# Patient Record
Sex: FEMALE | Race: WHITE | NOT HISPANIC OR LATINO
[De-identification: names, ages, dates, MRNs, and addresses within clinical notes are randomized per-mention and may not be internally consistent; named-entity substitution may affect disease eponyms.]

---

## 2017-03-16 ENCOUNTER — APPOINTMENT (OUTPATIENT)
Dept: PSYCHIATRY | Facility: CLINIC | Age: 27
End: 2017-03-16

## 2017-07-11 ENCOUNTER — EMERGENCY (EMERGENCY)
Facility: HOSPITAL | Age: 27
LOS: 1 days | Discharge: HOME | End: 2017-07-11
Admitting: EMERGENCY MEDICINE
Payer: MEDICAID

## 2017-07-11 PROCEDURE — 12013 RPR F/E/E/N/L/M 2.6-5.0 CM: CPT

## 2017-07-11 PROCEDURE — 99282 EMERGENCY DEPT VISIT SF MDM: CPT | Mod: 25

## 2017-07-11 PROCEDURE — 99283 EMERGENCY DEPT VISIT LOW MDM: CPT | Mod: 25

## 2017-07-21 ENCOUNTER — EMERGENCY (EMERGENCY)
Facility: HOSPITAL | Age: 27
LOS: 1 days | Discharge: HOME | End: 2017-07-21
Admitting: EMERGENCY MEDICINE
Payer: MEDICAID

## 2017-07-21 PROCEDURE — G0463: CPT

## 2018-06-04 ENCOUNTER — APPOINTMENT (OUTPATIENT)
Dept: FAMILY MEDICINE | Facility: CLINIC | Age: 28
End: 2018-06-04

## 2018-06-08 ENCOUNTER — APPOINTMENT (OUTPATIENT)
Dept: FAMILY MEDICINE | Facility: CLINIC | Age: 28
End: 2018-06-08
Payer: COMMERCIAL

## 2018-06-08 VITALS
OXYGEN SATURATION: 98 % | RESPIRATION RATE: 16 BRPM | TEMPERATURE: 98.4 F | BODY MASS INDEX: 22.53 KG/M2 | HEIGHT: 64 IN | WEIGHT: 132 LBS | HEART RATE: 98 BPM

## 2018-06-08 VITALS — DIASTOLIC BLOOD PRESSURE: 80 MMHG | SYSTOLIC BLOOD PRESSURE: 120 MMHG

## 2018-06-08 VITALS — BODY MASS INDEX: 22.36 KG/M2 | HEIGHT: 64 IN | WEIGHT: 131 LBS

## 2018-06-08 DIAGNOSIS — D22.9 MELANOCYTIC NEVI, UNSPECIFIED: ICD-10-CM

## 2018-06-08 PROCEDURE — 36415 COLL VENOUS BLD VENIPUNCTURE: CPT

## 2018-06-08 PROCEDURE — 99215 OFFICE O/P EST HI 40 MIN: CPT | Mod: 25

## 2018-06-11 NOTE — REVIEW OF SYSTEMS
[Fatigue] : fatigue [Recent Change In Weight] : ~T recent weight change [Fainting] : fainting [Negative] : Heme/Lymph [Fever] : no fever [Chills] : no chills [Hot Flashes] : no hot flashes [Night Sweats] : no night sweats [Headache] : no headache [Dizziness] : no dizziness [Memory Loss] : no memory loss [Unsteady Walking] : no ataxia

## 2018-06-11 NOTE — PHYSICAL EXAM

## 2018-06-11 NOTE — HISTORY OF PRESENT ILLNESS
[FreeTextEntry8] : Presents with worsening  feelings of depression and anxiety. Was on Lexapro, but stopped years ago because it made her feel like a "zombie" and made her gain weight.  Noted she is going through a lot at the moment. Maybe getting a divorce.  Symptoms do affect her from completing her normal daily activities. PHQ-9 was completed. Denies thought of hurting her self. Was being seen at the med station because she reports not having insurance. Her anxiety has also been worsening per patient. She has episodes that keep her from leaving her house. Has been taking 1mg of xanax with minimal relief. She reports that she was prescribed an additional 0.5 mg once a day with minimal effect. When she went to the Med station in May, she was seen by new provider who only renewed the 1 mg and not the additional 05.mg. \par \par Reports bruising easily. Wants to be tested for bleeding disorders. Denies abuse at home. Gets bruising with minimal trauma. Denies chela bleeding, or taking NSAIDs. Denies black stool, or pink to red urine. \par \par Seeking for referrals- \par Neurology- Reports having two seizures in May where she was seen by neurologist Dr. Araujo. She reports that the seizures where witnessed. Her friend and mother reported "seizure like jerking." Reports that she has an MRI pending. She will call to see if new insurance will cover MRI. Denies worsening head aches, vision changes, or weakness in any extremities. Reports that sister has brain Aneurysm.\par \par OBGYN Recent pap smear in January- was normal. \par \par Dermatology- Has multiple nevi. has not seen dermatologist recently. Requesting referral for a full body scan.\par

## 2018-06-13 LAB
ANA SER IF-ACNC: NEGATIVE
APTT BLD: 34.3 SEC
BASOPHILS # BLD AUTO: 0.09 K/UL
BASOPHILS NFR BLD AUTO: 1.1 %
EOSINOPHIL # BLD AUTO: 0.12 K/UL
EOSINOPHIL NFR BLD AUTO: 1.4 %
ERYTHROCYTE [SEDIMENTATION RATE] IN BLOOD BY WESTERGREN METHOD: 12 MM/HR
ESTIMATED AVERAGE GLUCOSE: 100 MG/DL
ESTROGEN SERPL-MCNC: 191 PG/ML
FSH SERPL-MCNC: 4 IU/L
HBA1C MFR BLD HPLC: 5.1 %
HCG SERPL-MCNC: <1 MIU/ML
HCT VFR BLD CALC: 43.7 %
HGB A MFR BLD: 97.1 %
HGB A2 MFR BLD: 2.9 %
HGB BLD-MCNC: 14.2 G/DL
HGB FRACT BLD-IMP: NORMAL
IMM GRANULOCYTES NFR BLD AUTO: 0.2 %
INR PPP: 0.94 RATIO
LH SERPL-ACNC: 10.4 IU/L
LYMPHOCYTES # BLD AUTO: 2.12 K/UL
LYMPHOCYTES NFR BLD AUTO: 25.5 %
MAN DIFF?: NORMAL
MCHC RBC-ENTMCNC: 29.2 PG
MCHC RBC-ENTMCNC: 32.5 GM/DL
MCV RBC AUTO: 89.7 FL
MONOCYTES # BLD AUTO: 0.73 K/UL
MONOCYTES NFR BLD AUTO: 8.8 %
NEUTROPHILS # BLD AUTO: 5.22 K/UL
NEUTROPHILS NFR BLD AUTO: 63 %
PLATELET # BLD AUTO: 331 K/UL
PROGEST SERPL-MCNC: 2.8 NG/ML
PROLACTIN SERPL-MCNC: 9.6 NG/ML
PT BLD: 10.6 SEC
RBC # BLD: 4.87 M/UL
RBC # FLD: 12.7 %
T4 FREE SERPL-MCNC: 1.2 NG/DL
TSH SERPL-ACNC: 1.12 UIU/ML
WBC # FLD AUTO: 8.3 K/UL

## 2018-06-28 ENCOUNTER — APPOINTMENT (OUTPATIENT)
Dept: FAMILY MEDICINE | Facility: CLINIC | Age: 28
End: 2018-06-28

## 2018-07-30 ENCOUNTER — APPOINTMENT (OUTPATIENT)
Dept: FAMILY MEDICINE | Facility: CLINIC | Age: 28
End: 2018-07-30
Payer: COMMERCIAL

## 2018-07-30 VITALS
BODY MASS INDEX: 23.56 KG/M2 | RESPIRATION RATE: 14 BRPM | OXYGEN SATURATION: 98 % | SYSTOLIC BLOOD PRESSURE: 120 MMHG | TEMPERATURE: 98.6 F | DIASTOLIC BLOOD PRESSURE: 70 MMHG | HEIGHT: 64 IN | HEART RATE: 96 BPM | WEIGHT: 138 LBS

## 2018-07-30 PROCEDURE — 99214 OFFICE O/P EST MOD 30 MIN: CPT

## 2018-07-30 RX ORDER — LEVETIRACETAM 500 MG/1
500 TABLET, FILM COATED ORAL
Refills: 0 | Status: ACTIVE | COMMUNITY
Start: 2018-07-30

## 2018-07-30 RX ORDER — ESCITALOPRAM OXALATE 20 MG/1
20 TABLET ORAL DAILY
Qty: 30 | Refills: 1 | Status: COMPLETED | COMMUNITY
Start: 2018-06-08 | End: 2018-07-30

## 2018-07-30 NOTE — HISTORY OF PRESENT ILLNESS
[FreeTextEntry1] : FU for anxiety and  HX of seizure disorder. [de-identified] : Overall feeling well with no acute complaints at visit. Symptoms and feelings of depression has improved. PHQ-9 completed and improved from prior. Patient is tolerating current dose of xanax with no signs of side effects, or  abuse. Pending to see a psychologist. Has to make appointment per patient. \par \par Recent witnessed seizure at work this past weekend. Being managed by neurology. Was seen at [facility] and received Ct of the head and neck. Was negative for acute findings per patient. HX of recent negative head MRI. Pending EEG Wednesday. Was stated on Kepra 500mg in the morning and 1000 mg at night.  Patient aware she can not drive. Mother drove to her visit.

## 2018-07-30 NOTE — REVIEW OF SYSTEMS
[Headache] : headache [Anxiety] : anxiety [Depression] : depression [Negative] : Integumentary [Dizziness] : no dizziness [Fainting] : no fainting [Confusion] : no confusion [Memory Loss] : no memory loss [Unsteady Walking] : no ataxia [Suicidal] : not suicidal [Insomnia] : no insomnia [Easy Bleeding] : no easy bleeding [Easy Bruising] : no easy bruising [Swollen Glands] : no swollen glands

## 2018-07-30 NOTE — PHYSICAL EXAM

## 2018-08-27 ENCOUNTER — RX RENEWAL (OUTPATIENT)
Age: 28
End: 2018-08-27

## 2018-09-26 ENCOUNTER — APPOINTMENT (OUTPATIENT)
Dept: FAMILY MEDICINE | Facility: CLINIC | Age: 28
End: 2018-09-26
Payer: SELF-PAY

## 2018-09-26 VITALS
WEIGHT: 140 LBS | HEIGHT: 64 IN | SYSTOLIC BLOOD PRESSURE: 120 MMHG | BODY MASS INDEX: 23.9 KG/M2 | RESPIRATION RATE: 14 BRPM | OXYGEN SATURATION: 98 % | DIASTOLIC BLOOD PRESSURE: 82 MMHG | TEMPERATURE: 98.4 F | HEART RATE: 98 BPM

## 2018-09-26 DIAGNOSIS — F32.9 MAJOR DEPRESSIVE DISORDER, SINGLE EPISODE, UNSPECIFIED: ICD-10-CM

## 2018-09-26 DIAGNOSIS — R56.9 UNSPECIFIED CONVULSIONS: ICD-10-CM

## 2018-09-26 DIAGNOSIS — F41.9 ANXIETY DISORDER, UNSPECIFIED: ICD-10-CM

## 2018-09-26 PROCEDURE — 99214 OFFICE O/P EST MOD 30 MIN: CPT

## 2018-09-26 RX ORDER — ALPRAZOLAM 0.5 MG/1
0.5 TABLET ORAL
Qty: 60 | Refills: 0 | Status: COMPLETED | COMMUNITY
Start: 2018-06-08 | End: 2018-09-26

## 2018-09-26 NOTE — PHYSICAL EXAM
[No Acute Distress] : no acute distress [Well Nourished] : well nourished [Well Developed] : well developed [Well-Appearing] : well-appearing [Normal Sclera/Conjunctiva] : normal sclera/conjunctiva [No Respiratory Distress] : no respiratory distress  [Clear to Auscultation] : lungs were clear to auscultation bilaterally [No Accessory Muscle Use] : no accessory muscle use [Normal Rate] : normal rate  [Regular Rhythm] : with a regular rhythm [Normal S1, S2] : normal S1 and S2 [No Murmur] : no murmur heard [No Carotid Bruits] : no carotid bruits [No Abdominal Bruit] : a ~M bruit was not heard ~T in the abdomen [No Varicosities] : no varicosities [Pedal Pulses Present] : the pedal pulses are present [No Edema] : there was no peripheral edema [No Extremity Clubbing/Cyanosis] : no extremity clubbing/cyanosis [No Palpable Aorta] : no palpable aorta [Soft] : abdomen soft [Non Tender] : non-tender [Non-distended] : non-distended [No Masses] : no abdominal mass palpated [No HSM] : no HSM [Normal Bowel Sounds] : normal bowel sounds [Normal Posterior Cervical Nodes] : no posterior cervical lymphadenopathy [Normal Anterior Cervical Nodes] : no anterior cervical lymphadenopathy [No CVA Tenderness] : no CVA  tenderness [No Spinal Tenderness] : no spinal tenderness [No Joint Swelling] : no joint swelling [Grossly Normal Strength/Tone] : grossly normal strength/tone [No Rash] : no rash [Normal Gait] : normal gait [Coordination Grossly Intact] : coordination grossly intact [No Focal Deficits] : no focal deficits [Deep Tendon Reflexes (DTR)] : deep tendon reflexes were 2+ and symmetric [Normal] : coordination was normal [Normal Affect] : the affect was normal [Normal Insight/Judgement] : insight and judgment were intact [de-identified] : Flat affect [de-identified] : MX nevi noted.

## 2018-09-26 NOTE — HISTORY OF PRESENT ILLNESS
[FreeTextEntry1] : Overall feels well today with no acute complaints. FU for anxiety and depression. [de-identified] : Depression- Symptoms unchanged per patient. Still has acute stressors in her life that she is dealing with. Reports going through a potential divorce. PHQ-9 Completed. Resulted as PHQ-9 completed- result was a 6- mild Depression. Denies suicidal ideation. In the past attempted to take Lexapro, but stopped because of side effects - weight gain and and decrease in libido. Has started to talk to counselor/ - [facility]. Talks with her weekly. Has "issue with insurance" and has been unable to make appointment psychiatrist.  \par \par \par Anxiety- reports that anxiety mildly improved from last visit. Reports less panic attacks and acute symptoms. Has been tolerating xanax with no signs of overmedicating or diversion. No known triggers for anxiety, but reports that current stressors in her life has been making her very anxious. \par \par \par Seizures- being managed by neurologist. Has followup appointment in one month. Minimal headaches noted. Has not had seizure since last visit.

## 2018-09-26 NOTE — REVIEW OF SYSTEMS
[Suicidal] : not suicidal [Insomnia] : no insomnia [Anxiety] : anxiety [Depression] : depression [Negative] : Heme/Lymph

## 2018-09-26 NOTE — PHYSICAL EXAM
[No Acute Distress] : no acute distress [Well Nourished] : well nourished [Well Developed] : well developed [Well-Appearing] : well-appearing [Normal Sclera/Conjunctiva] : normal sclera/conjunctiva [No Respiratory Distress] : no respiratory distress  [Clear to Auscultation] : lungs were clear to auscultation bilaterally [No Accessory Muscle Use] : no accessory muscle use [Normal Rate] : normal rate  [Regular Rhythm] : with a regular rhythm [Normal S1, S2] : normal S1 and S2 [No Murmur] : no murmur heard [No Carotid Bruits] : no carotid bruits [No Abdominal Bruit] : a ~M bruit was not heard ~T in the abdomen [No Varicosities] : no varicosities [Pedal Pulses Present] : the pedal pulses are present [No Edema] : there was no peripheral edema [No Extremity Clubbing/Cyanosis] : no extremity clubbing/cyanosis [No Palpable Aorta] : no palpable aorta [Soft] : abdomen soft [Non Tender] : non-tender [Non-distended] : non-distended [No Masses] : no abdominal mass palpated [No HSM] : no HSM [Normal Bowel Sounds] : normal bowel sounds [Normal Posterior Cervical Nodes] : no posterior cervical lymphadenopathy [Normal Anterior Cervical Nodes] : no anterior cervical lymphadenopathy [No CVA Tenderness] : no CVA  tenderness [No Spinal Tenderness] : no spinal tenderness [No Joint Swelling] : no joint swelling [Grossly Normal Strength/Tone] : grossly normal strength/tone [No Rash] : no rash [Normal Gait] : normal gait [Coordination Grossly Intact] : coordination grossly intact [No Focal Deficits] : no focal deficits [Deep Tendon Reflexes (DTR)] : deep tendon reflexes were 2+ and symmetric [Normal] : coordination was normal [Normal Affect] : the affect was normal [Normal Insight/Judgement] : insight and judgment were intact [de-identified] : Flat affect [de-identified] : MX nevi noted.

## 2018-09-26 NOTE — HISTORY OF PRESENT ILLNESS
[FreeTextEntry1] : Overall feels well today with no acute complaints. FU for anxiety and depression. [de-identified] : Depression- Symptoms unchanged per patient. Still has acute stressors in her life that she is dealing with. Reports going through a potential divorce. PHQ-9 Completed. Resulted as PHQ-9 completed- result was a 6- mild Depression. Denies suicidal ideation. In the past attempted to take Lexapro, but stopped because of side effects - weight gain and and decrease in libido. Has started to talk to counselor/ - [facility]. Talks with her weekly. Has "issue with insurance" and has been unable to make appointment psychiatrist.  \par \par \par Anxiety- reports that anxiety mildly improved from last visit. Reports less panic attacks and acute symptoms. Has been tolerating xanax with no signs of overmedicating or diversion. No known triggers for anxiety, but reports that current stressors in her life has been making her very anxious. \par \par \par Seizures- being managed by neurologist. Has followup appointment in one month. Minimal headaches noted. Has not had seizure since last visit.

## 2018-12-21 ENCOUNTER — RX RENEWAL (OUTPATIENT)
Age: 28
End: 2018-12-21

## 2019-01-02 ENCOUNTER — APPOINTMENT (OUTPATIENT)
Dept: FAMILY MEDICINE | Facility: CLINIC | Age: 29
End: 2019-01-02

## 2019-01-25 ENCOUNTER — OTHER (OUTPATIENT)
Age: 29
End: 2019-01-25

## 2019-02-11 ENCOUNTER — APPOINTMENT (OUTPATIENT)
Dept: FAMILY MEDICINE | Facility: CLINIC | Age: 29
End: 2019-02-11

## 2022-03-21 ENCOUNTER — APPOINTMENT (OUTPATIENT)
Dept: ANTEPARTUM | Facility: CLINIC | Age: 32
End: 2022-03-21
Payer: MEDICAID

## 2022-03-21 ENCOUNTER — ASOB RESULT (OUTPATIENT)
Age: 32
End: 2022-03-21

## 2022-03-21 ENCOUNTER — NON-APPOINTMENT (OUTPATIENT)
Age: 32
End: 2022-03-21

## 2022-03-21 PROCEDURE — 76817 TRANSVAGINAL US OBSTETRIC: CPT

## 2022-03-21 PROCEDURE — 76811 OB US DETAILED SNGL FETUS: CPT

## 2024-12-04 ENCOUNTER — APPOINTMENT (OUTPATIENT)
Dept: ANTEPARTUM | Facility: CLINIC | Age: 34
End: 2024-12-04
Payer: MEDICAID

## 2024-12-04 ENCOUNTER — ASOB RESULT (OUTPATIENT)
Age: 34
End: 2024-12-04

## 2024-12-04 PROCEDURE — 76811 OB US DETAILED SNGL FETUS: CPT

## 2024-12-13 ENCOUNTER — APPOINTMENT (OUTPATIENT)
Dept: ANTEPARTUM | Facility: CLINIC | Age: 34
End: 2024-12-13

## 2024-12-19 ENCOUNTER — APPOINTMENT (OUTPATIENT)
Dept: ANTEPARTUM | Facility: CLINIC | Age: 34
End: 2024-12-19

## 2025-01-02 ENCOUNTER — ASOB RESULT (OUTPATIENT)
Age: 35
End: 2025-01-02

## 2025-01-02 ENCOUNTER — APPOINTMENT (OUTPATIENT)
Dept: ANTEPARTUM | Facility: CLINIC | Age: 35
End: 2025-01-02

## 2025-01-02 PROCEDURE — 76816 OB US FOLLOW-UP PER FETUS: CPT

## 2025-04-01 ENCOUNTER — INPATIENT (INPATIENT)
Facility: HOSPITAL | Age: 35
LOS: 2 days | Discharge: HOME | End: 2025-04-04
Attending: OBSTETRICS & GYNECOLOGY | Admitting: OBSTETRICS & GYNECOLOGY

## 2025-04-01 ENCOUNTER — APPOINTMENT (OUTPATIENT)
Dept: ANTEPARTUM | Facility: CLINIC | Age: 35
End: 2025-04-01

## 2025-04-01 VITALS
TEMPERATURE: 98 F | SYSTOLIC BLOOD PRESSURE: 140 MMHG | RESPIRATION RATE: 15 BRPM | HEART RATE: 102 BPM | DIASTOLIC BLOOD PRESSURE: 94 MMHG

## 2025-04-01 DIAGNOSIS — O14.90 UNSPECIFIED PRE-ECLAMPSIA, UNSPECIFIED TRIMESTER: ICD-10-CM

## 2025-04-01 DIAGNOSIS — O26.899 OTHER SPECIFIED PREGNANCY RELATED CONDITIONS, UNSPECIFIED TRIMESTER: ICD-10-CM

## 2025-04-01 LAB
ALBUMIN SERPL ELPH-MCNC: 3.5 G/DL — SIGNIFICANT CHANGE UP (ref 3.3–5)
ALP SERPL-CCNC: 153 U/L — HIGH (ref 40–120)
ALT FLD-CCNC: 14 U/L — SIGNIFICANT CHANGE UP (ref 4–33)
ANION GAP SERPL CALC-SCNC: 15 MMOL/L — HIGH (ref 7–14)
APPEARANCE UR: CLEAR — SIGNIFICANT CHANGE UP
AST SERPL-CCNC: 18 U/L — SIGNIFICANT CHANGE UP (ref 4–32)
BASOPHILS # BLD AUTO: 0.06 K/UL — SIGNIFICANT CHANGE UP (ref 0–0.2)
BASOPHILS NFR BLD AUTO: 0.4 % — SIGNIFICANT CHANGE UP (ref 0–2)
BILIRUB SERPL-MCNC: 0.2 MG/DL — SIGNIFICANT CHANGE UP (ref 0.2–1.2)
BILIRUB UR-MCNC: NEGATIVE — SIGNIFICANT CHANGE UP
BUN SERPL-MCNC: 5 MG/DL — LOW (ref 7–23)
CALCIUM SERPL-MCNC: 8.8 MG/DL — SIGNIFICANT CHANGE UP (ref 8.4–10.5)
CHLORIDE SERPL-SCNC: 103 MMOL/L — SIGNIFICANT CHANGE UP (ref 98–107)
CO2 SERPL-SCNC: 17 MMOL/L — LOW (ref 22–31)
COLOR SPEC: YELLOW — SIGNIFICANT CHANGE UP
CREAT ?TM UR-MCNC: 26 MG/DL — SIGNIFICANT CHANGE UP
CREAT SERPL-MCNC: 0.45 MG/DL — LOW (ref 0.5–1.3)
DIFF PNL FLD: NEGATIVE — SIGNIFICANT CHANGE UP
EGFR: 129 ML/MIN/1.73M2 — SIGNIFICANT CHANGE UP
EGFR: 129 ML/MIN/1.73M2 — SIGNIFICANT CHANGE UP
EOSINOPHIL # BLD AUTO: 0.13 K/UL — SIGNIFICANT CHANGE UP (ref 0–0.5)
EOSINOPHIL NFR BLD AUTO: 0.9 % — SIGNIFICANT CHANGE UP (ref 0–6)
GLUCOSE SERPL-MCNC: 99 MG/DL — SIGNIFICANT CHANGE UP (ref 70–99)
GLUCOSE UR QL: NEGATIVE MG/DL — SIGNIFICANT CHANGE UP
HCT VFR BLD CALC: 33.7 % — LOW (ref 34.5–45)
HGB BLD-MCNC: 11.8 G/DL — SIGNIFICANT CHANGE UP (ref 11.5–15.5)
IANC: 10.35 K/UL — HIGH (ref 1.8–7.4)
IMM GRANULOCYTES NFR BLD AUTO: 1.6 % — HIGH (ref 0–0.9)
KETONES UR-MCNC: NEGATIVE MG/DL — SIGNIFICANT CHANGE UP
LDH SERPL L TO P-CCNC: 163 U/L — SIGNIFICANT CHANGE UP (ref 135–225)
LEUKOCYTE ESTERASE UR-ACNC: NEGATIVE — SIGNIFICANT CHANGE UP
LYMPHOCYTES # BLD AUTO: 16.3 % — SIGNIFICANT CHANGE UP (ref 13–44)
LYMPHOCYTES # BLD AUTO: 2.24 K/UL — SIGNIFICANT CHANGE UP (ref 1–3.3)
MCHC RBC-ENTMCNC: 29.5 PG — SIGNIFICANT CHANGE UP (ref 27–34)
MCHC RBC-ENTMCNC: 35 G/DL — SIGNIFICANT CHANGE UP (ref 32–36)
MCV RBC AUTO: 84.3 FL — SIGNIFICANT CHANGE UP (ref 80–100)
MONOCYTES # BLD AUTO: 0.75 K/UL — SIGNIFICANT CHANGE UP (ref 0–0.9)
MONOCYTES NFR BLD AUTO: 5.5 % — SIGNIFICANT CHANGE UP (ref 2–14)
NEUTROPHILS # BLD AUTO: 10.35 K/UL — HIGH (ref 1.8–7.4)
NEUTROPHILS NFR BLD AUTO: 75.3 % — SIGNIFICANT CHANGE UP (ref 43–77)
NITRITE UR-MCNC: NEGATIVE — SIGNIFICANT CHANGE UP
NRBC # BLD AUTO: 0 K/UL — SIGNIFICANT CHANGE UP (ref 0–0)
NRBC # FLD: 0 K/UL — SIGNIFICANT CHANGE UP (ref 0–0)
NRBC BLD AUTO-RTO: 0 /100 WBCS — SIGNIFICANT CHANGE UP (ref 0–0)
PH UR: 7 — SIGNIFICANT CHANGE UP (ref 5–8)
PLATELET # BLD AUTO: 311 K/UL — SIGNIFICANT CHANGE UP (ref 150–400)
POTASSIUM SERPL-MCNC: 4 MMOL/L — SIGNIFICANT CHANGE UP (ref 3.5–5.3)
POTASSIUM SERPL-SCNC: 4 MMOL/L — SIGNIFICANT CHANGE UP (ref 3.5–5.3)
PROT ?TM UR-MCNC: 6 MG/DL — SIGNIFICANT CHANGE UP
PROT SERPL-MCNC: 6.6 G/DL — SIGNIFICANT CHANGE UP (ref 6–8.3)
PROT UR-MCNC: NEGATIVE MG/DL — SIGNIFICANT CHANGE UP
PROT/CREAT UR-RTO: 0.2 RATIO — SIGNIFICANT CHANGE UP (ref 0–0.2)
RBC # BLD: 4 M/UL — SIGNIFICANT CHANGE UP (ref 3.8–5.2)
RBC # FLD: 13.6 % — SIGNIFICANT CHANGE UP (ref 10.3–14.5)
RH IG SCN BLD-IMP: POSITIVE — SIGNIFICANT CHANGE UP
SODIUM SERPL-SCNC: 135 MMOL/L — SIGNIFICANT CHANGE UP (ref 135–145)
SP GR SPEC: 1.01 — SIGNIFICANT CHANGE UP (ref 1–1.03)
URATE SERPL-MCNC: 3.7 MG/DL — SIGNIFICANT CHANGE UP (ref 2.5–7)
UROBILINOGEN FLD QL: 0.2 MG/DL — SIGNIFICANT CHANGE UP (ref 0.2–1)
WBC # BLD: 13.75 K/UL — HIGH (ref 3.8–10.5)
WBC # FLD AUTO: 13.75 K/UL — HIGH (ref 3.8–10.5)

## 2025-04-01 RX ORDER — ACETAMINOPHEN 500 MG/5ML
1000 LIQUID (ML) ORAL ONCE
Refills: 0 | Status: COMPLETED | OUTPATIENT
Start: 2025-04-01 | End: 2025-04-01

## 2025-04-01 RX ORDER — MAGNESIUM SULFATE 500 MG/ML
2 SYRINGE (ML) INJECTION
Qty: 40 | Refills: 0 | Status: COMPLETED | OUTPATIENT
Start: 2025-04-01 | End: 2025-04-02

## 2025-04-01 RX ORDER — OXYTOCIN-SODIUM CHLORIDE 0.9% IV SOLN 30 UNIT/500ML 30-0.9/5 UT/ML-%
167 SOLUTION INTRAVENOUS
Qty: 30 | Refills: 0 | Status: DISCONTINUED | OUTPATIENT
Start: 2025-04-01 | End: 2025-04-02

## 2025-04-01 RX ORDER — SODIUM CHLORIDE 9 G/1000ML
500 INJECTION, SOLUTION INTRAVENOUS ONCE
Refills: 0 | Status: DISCONTINUED | OUTPATIENT
Start: 2025-04-01 | End: 2025-04-02

## 2025-04-01 RX ORDER — NIFEDIPINE 30 MG
10 TABLET, EXTENDED RELEASE 24 HR ORAL ONCE
Refills: 0 | Status: COMPLETED | OUTPATIENT
Start: 2025-04-01 | End: 2025-04-01

## 2025-04-01 RX ORDER — SODIUM CHLORIDE 9 G/1000ML
1000 INJECTION, SOLUTION INTRAVENOUS
Refills: 0 | Status: DISCONTINUED | OUTPATIENT
Start: 2025-04-01 | End: 2025-04-04

## 2025-04-01 RX ORDER — MAGNESIUM SULFATE 500 MG/ML
4 SYRINGE (ML) INJECTION ONCE
Refills: 0 | Status: COMPLETED | OUTPATIENT
Start: 2025-04-01 | End: 2025-04-01

## 2025-04-01 RX ORDER — SODIUM CHLORIDE 9 G/1000ML
1000 INJECTION, SOLUTION INTRAVENOUS
Refills: 0 | Status: DISCONTINUED | OUTPATIENT
Start: 2025-04-01 | End: 2025-04-02

## 2025-04-01 RX ORDER — CITRIC ACID/SODIUM CITRATE 300-500 MG
15 SOLUTION, ORAL ORAL EVERY 6 HOURS
Refills: 0 | Status: DISCONTINUED | OUTPATIENT
Start: 2025-04-01 | End: 2025-04-02

## 2025-04-01 RX ORDER — SODIUM CHLORIDE 9 G/1000ML
1000 INJECTION, SOLUTION INTRAVENOUS ONCE
Refills: 0 | Status: DISCONTINUED | OUTPATIENT
Start: 2025-04-01 | End: 2025-04-02

## 2025-04-01 RX ADMIN — Medication 300 GRAM(S): at 18:03

## 2025-04-01 RX ADMIN — Medication 1 APPLICATION(S): at 17:19

## 2025-04-01 RX ADMIN — Medication 1000 MILLIGRAM(S): at 19:06

## 2025-04-01 RX ADMIN — SODIUM CHLORIDE 125 MILLILITER(S): 9 INJECTION, SOLUTION INTRAVENOUS at 17:16

## 2025-04-01 RX ADMIN — SODIUM CHLORIDE 50 MILLILITER(S): 9 INJECTION, SOLUTION INTRAVENOUS at 18:31

## 2025-04-01 RX ADMIN — Medication 50 GM/HR: at 18:26

## 2025-04-01 RX ADMIN — Medication 10 MILLIGRAM(S): at 23:58

## 2025-04-01 RX ADMIN — Medication 1000 MILLIGRAM(S): at 18:30

## 2025-04-01 RX ADMIN — Medication 50 GM/HR: at 19:22

## 2025-04-01 NOTE — OB RN PATIENT PROFILE - LIMIT VISITORS, INFANT PROFILE
Caller: Tiny Avina    Relationship: Self    Best call back number: [phone]    What orders are you requesting (i.e. lab or imaging): LABS FOR ADRENAL TUMOR Pheochromocytoma    ntains abnormal data Metanephrines, Pheochromocytoma Evaluation - Urine, Clean Catch      In what timeframe would the patient need to come in: ASAP    Where will you receive your lab/imaging services: IN [place]    Additional notes: PATIENT REQUESTING AN ORDER BE PUT ON MY CHART FOR THE ABOVE TEST.    PATIENT IN [place] AND REQUESTING TO HAVE BLOOD DRAWN DOWN THERE.    PATIENT REQUESTING A CALL WHEN ENTERED IN MY CHART.         no

## 2025-04-01 NOTE — OB RN TRIAGE NOTE - FALL HARM RISK - UNIVERSAL INTERVENTIONS
Bed in lowest position, wheels locked, appropriate side rails in place/Call bell, personal items and telephone in reach/Instruct patient to call for assistance before getting out of bed or chair/Non-slip footwear when patient is out of bed/[place] to call system/Physically safe environment - no spills, clutter or unnecessary equipment/Purposeful Proactive Rounding/Room/bathroom lighting operational, light cord in reach

## 2025-04-01 NOTE — OB RN PATIENT PROFILE - NSPROMEDSBROUGHTTOHOSP_GEN_A_NUR
Closure 4 Information: This tab is for additional flaps and grafts above and beyond our usual structured repairs.  Please note if you enter information here it will not currently bill and you will need to add the billing information manually. no

## 2025-04-01 NOTE — PROVIDER CONTACT NOTE (OTHER) - RECOMMENDATIONS
As per MD Young, okay for remainder of prenatal labs to be drawn with magnesium level to cluster care.
NP Maingrette at bedside reinforcing education.

## 2025-04-01 NOTE — PROVIDER CONTACT NOTE (OTHER) - SITUATION
Patient prenatal labs do not show rubella, RPR, hepatitis B and C
Patient refusing Magnesium Sulfate Infusion

## 2025-04-01 NOTE — OB PROVIDER H&P - HISTORY OF PRESENT ILLNESS
35 yo , EGA@37 2/7 weeks, presented to D&T from the OB office with c/o elevated blood pressure 150/100, 1st time her blood pressure has been elevated during this pregnancy, Pt also report Headache for the past couple days worst yesterday, now 3/10, Pt denies taking any Tylenols. Pt also report seeing spots  while waiting in triage area.  Denies chest pain, palpitations, shortness of breath, cough, nausea, vomiting.  report jacques barfield at times, denies vaginal bleeding, leakage of fluid, and reports decrease fetal movement since yesterday.   Pt report that she was check by Dr. Vasquez in the office today and she was 2cm dilated.  Prenatal care with Dr. Vasquez  Prenatal course is uncomplicated till today  GBS status is negative 25  Meds: PNV  Allergies: NKDA

## 2025-04-01 NOTE — OB PROVIDER H&P - NSHPPHYSICALEXAM_GEN_ALL_CORE
Vital Signs Last 24 Hrs  T(C): 36.6 (01 Apr 2025 15:44), Max: 36.6 (01 Apr 2025 15:22)  T(F): 97.88 (01 Apr 2025 15:44), Max: 97.9 (01 Apr 2025 15:22)  HR: 94 (01 Apr 2025 16:46) (94 - 108)  BP: 136/84 (01 Apr 2025 16:46) (130/86 - 140/94)  BP(mean): --  RR: 16 (01 Apr 2025 15:44) (15 - 16)  SpO2: --  General: Female sitting comfortably   Neuro: No facial asymmetry, no slurred speech, moves all 4 extremities  Mood: Alert and lucid, appropriate mood and affect  Head: Normocephalic. Atraumatic.   Eyes: No discharge, lids normal, conjunctiva normal  Lungs: No respiratory distress  Abdomen: Soft, nontender. Gravid. No contractions on palpation  Extremities: No LE edema bilaterally    NST and BPP done to assess fetal surveillance and results as follows:  NST  Baseline  (   135       ) BPM  Variability ( x )  Moderate   (  ) Minimal  (  ) Absent  (  )  Marked  Accelerations ( x ) 15x15   (  ) 10x10  (  ) no  Decelerations ( x ) no  (  ) Variable  (  ) Early  (  ) Late      Description _________  Contractions ( x ) no  (  ) yes     Description  __________  Interpretation ( x ) reactive   (  )  non-reactive  saved in ASOB- TAUS- cephalic presentation, Anterior placenta, MVP 4.49cm, M-mode 165bpm   SVE: 2/50/-3 exam done by Dr. Ross_rachell in the office

## 2025-04-01 NOTE — PROVIDER CONTACT NOTE (OTHER) - ASSESSMENT
Patient currently on magnesium sulfate infusion. Magnesium level to be drawn at 0000 ( 6 hours after initiation of bolus)
Vitals as charted. Purpose of magnesium sulfate in the setting of preeclampsia with severe features explained to patient and significant other at bedside. P

## 2025-04-01 NOTE — PROVIDER CONTACT NOTE (OTHER) - ACTION/TREATMENT ORDERED:
NP Maingrette at bedside reinforcing education.  Patient requesting to speak with covering MD Liu
As per MD Young, okay for remainder of prenatal labs to be drawn with magnesium level to cluster care. Information passed on to oncoming nurse

## 2025-04-01 NOTE — OB RN TRIAGE NOTE - MENTAL HEALTH CONDITIONS/SYMPTOMS, PROFILE
pt denies but depr/anx listed in med hx/none pt denies but depr/anx listed in med hx/anxiety disorder

## 2025-04-01 NOTE — OB PROVIDER H&P - NSLOWPPHRISK_OBGYN_A_OB
No previous uterine incision/Lance Pregnancy/Less than or equal to 4 previous vaginal births/No known bleeding disorder/No history of postpartum hemorrhage

## 2025-04-01 NOTE — OB PROVIDER H&P - ASSESSMENT
33 yo , EGA@37 2/7 weeks, presented with elevated blood pressure in the office, Headache and seeing spot.   Plan:   Admit to labor and delivery for IOL for severe PEC with Buccal cytotec   Prenatal issue followed by plan     PEC –/Mg for seizure prophylaxis     Standard labs (T&S, CBC, RPR, and serology)   GBS negative   IOL/ Section etc.     EFM monitoring   Diet clear diet, IV fluids   Consent signed   Regional anesthesia, PRN   Informed Consent was obtained. The following was discussed:    Induction/Augmentation of Labor: Use of medication and/or cook balloon to begin or enhance labor   Obstetrical management including internal fetal/contraction monitoring    Normal vaginal delivery   Possible  Section: (surgical cut in the abdomen to deliver the baby)    Amnioinfusion: fluid placed in the uterus through the vagina.     Risks, benefits, alternatives and possible complications have been discussed in detail with the patient in her native language. Pre-admission, admission, and post admission procedures and expectations were discussed in detail. All questions answered, all appropriate hospital consents were signed.  Anticipated vaginal delivery  Discuss with Dr. Liu/ Dr Madera PGY-3    KARI Alegria NP

## 2025-04-01 NOTE — PROVIDER CONTACT NOTE (OTHER) - BACKGROUND
37.2 weeks  admitted for IOL for sPEC.
 37.2 weeks gestation Patient sent in from office for elevated blood pressures. In triage, presented with 3/10 headache and reported seeing " spots"

## 2025-04-02 ENCOUNTER — TRANSCRIPTION ENCOUNTER (OUTPATIENT)
Age: 35
End: 2025-04-02

## 2025-04-02 LAB
BLD GP AB SCN SERPL QL: NEGATIVE — SIGNIFICANT CHANGE UP
HBV SURFACE AG SER-ACNC: SIGNIFICANT CHANGE UP
HCV AB S/CO SERPL IA: 0.08 S/CO — SIGNIFICANT CHANGE UP (ref 0–0.79)
HCV AB SERPL-IMP: SIGNIFICANT CHANGE UP
MAGNESIUM SERPL-MCNC: 4.2 MG/DL — HIGH (ref 1.6–2.6)
MAGNESIUM SERPL-MCNC: 4.8 MG/DL — HIGH (ref 1.6–2.6)
MAGNESIUM SERPL-MCNC: 5.3 MG/DL — HIGH (ref 1.6–2.6)
MAGNESIUM SERPL-MCNC: 5.5 MG/DL — HIGH (ref 1.6–2.6)
RH IG SCN BLD-IMP: POSITIVE — SIGNIFICANT CHANGE UP
RUBV IGG SER-ACNC: 1.61 INDEX — SIGNIFICANT CHANGE UP
RUBV IGG SER-IMP: POSITIVE — SIGNIFICANT CHANGE UP
T PALLIDUM AB TITR SER: NEGATIVE — SIGNIFICANT CHANGE UP

## 2025-04-02 RX ORDER — OXYTOCIN-SODIUM CHLORIDE 0.9% IV SOLN 30 UNIT/500ML 30-0.9/5 UT/ML-%
167 SOLUTION INTRAVENOUS
Qty: 30 | Refills: 0 | Status: DISCONTINUED | OUTPATIENT
Start: 2025-04-02 | End: 2025-04-04

## 2025-04-02 RX ORDER — NIFEDIPINE 30 MG
30 TABLET, EXTENDED RELEASE 24 HR ORAL DAILY
Refills: 0 | Status: DISCONTINUED | OUTPATIENT
Start: 2025-04-02 | End: 2025-04-04

## 2025-04-02 RX ORDER — DIBUCAINE 10 MG/G
1 OINTMENT TOPICAL EVERY 6 HOURS
Refills: 0 | Status: DISCONTINUED | OUTPATIENT
Start: 2025-04-02 | End: 2025-04-04

## 2025-04-02 RX ORDER — IBUPROFEN 200 MG
600 TABLET ORAL EVERY 6 HOURS
Refills: 0 | Status: COMPLETED | OUTPATIENT
Start: 2025-04-02 | End: 2026-03-01

## 2025-04-02 RX ORDER — BENZOCAINE 220 MG/G
1 SPRAY, METERED PERIODONTAL EVERY 6 HOURS
Refills: 0 | Status: DISCONTINUED | OUTPATIENT
Start: 2025-04-02 | End: 2025-04-04

## 2025-04-02 RX ORDER — HYDROCORTISONE 10 MG/G
1 CREAM TOPICAL EVERY 6 HOURS
Refills: 0 | Status: DISCONTINUED | OUTPATIENT
Start: 2025-04-02 | End: 2025-04-04

## 2025-04-02 RX ORDER — PRENATAL 136/IRON/FOLIC ACID 27 MG-1 MG
0 TABLET ORAL
Refills: 0 | DISCHARGE

## 2025-04-02 RX ORDER — MAGNESIUM HYDROXIDE 400 MG/5ML
30 SUSPENSION ORAL
Refills: 0 | Status: DISCONTINUED | OUTPATIENT
Start: 2025-04-02 | End: 2025-04-04

## 2025-04-02 RX ORDER — ACETAMINOPHEN 500 MG/5ML
975 LIQUID (ML) ORAL
Refills: 0 | Status: DISCONTINUED | OUTPATIENT
Start: 2025-04-02 | End: 2025-04-04

## 2025-04-02 RX ORDER — MODIFIED LANOLIN 100 %
1 CREAM (GRAM) TOPICAL EVERY 6 HOURS
Refills: 0 | Status: DISCONTINUED | OUTPATIENT
Start: 2025-04-02 | End: 2025-04-04

## 2025-04-02 RX ORDER — OXYTOCIN-SODIUM CHLORIDE 0.9% IV SOLN 30 UNIT/500ML 30-0.9/5 UT/ML-%
SOLUTION INTRAVENOUS
Qty: 30 | Refills: 0 | Status: DISCONTINUED | OUTPATIENT
Start: 2025-04-02 | End: 2025-04-02

## 2025-04-02 RX ORDER — WITCH HAZEL LEAF
1 FLUID EXTRACT MISCELLANEOUS EVERY 4 HOURS
Refills: 0 | Status: DISCONTINUED | OUTPATIENT
Start: 2025-04-02 | End: 2025-04-04

## 2025-04-02 RX ORDER — ACETAMINOPHEN 500 MG/5ML
1000 LIQUID (ML) ORAL ONCE
Refills: 0 | Status: COMPLETED | OUTPATIENT
Start: 2025-04-02 | End: 2025-04-02

## 2025-04-02 RX ORDER — CLOSTRIDIUM TETANI TOXOID ANTIGEN (FORMALDEHYDE INACTIVATED), CORYNEBACTERIUM DIPHTHERIAE TOXOID ANTIGEN (FORMALDEHYDE INACTIVATED), BORDETELLA PERTUSSIS TOXOID ANTIGEN (GLUTARALDEHYDE INACTIVATED), BORDETELLA PERTUSSIS FILAMENTOUS HEMAGGLUTININ ANTIGEN (FORMALDEHYDE INACTIVATED), BORDETELLA PERTUSSIS PERTACTIN ANTIGEN, AND BORDETELLA PERTUSSIS FIMBRIAE 2/3 ANTIGEN 5; 2; 2.5; 5; 3; 5 [LF]/.5ML; [LF]/.5ML; UG/.5ML; UG/.5ML; UG/.5ML; UG/.5ML
0.5 INJECTION, SUSPENSION INTRAMUSCULAR ONCE
Refills: 0 | Status: DISCONTINUED | OUTPATIENT
Start: 2025-04-02 | End: 2025-04-04

## 2025-04-02 RX ORDER — NIFEDIPINE 30 MG
10 TABLET, EXTENDED RELEASE 24 HR ORAL ONCE
Refills: 0 | Status: COMPLETED | OUTPATIENT
Start: 2025-04-02 | End: 2025-04-02

## 2025-04-02 RX ORDER — PRENATAL 136/IRON/FOLIC ACID 27 MG-1 MG
1 TABLET ORAL DAILY
Refills: 0 | Status: DISCONTINUED | OUTPATIENT
Start: 2025-04-02 | End: 2025-04-04

## 2025-04-02 RX ORDER — OXYCODONE HYDROCHLORIDE 30 MG/1
5 TABLET ORAL
Refills: 0 | Status: DISCONTINUED | OUTPATIENT
Start: 2025-04-02 | End: 2025-04-04

## 2025-04-02 RX ORDER — SIMETHICONE 80 MG
80 TABLET,CHEWABLE ORAL EVERY 4 HOURS
Refills: 0 | Status: DISCONTINUED | OUTPATIENT
Start: 2025-04-02 | End: 2025-04-04

## 2025-04-02 RX ORDER — PRAMOXINE HCL 1 %
1 GEL (GRAM) TOPICAL EVERY 4 HOURS
Refills: 0 | Status: DISCONTINUED | OUTPATIENT
Start: 2025-04-02 | End: 2025-04-04

## 2025-04-02 RX ORDER — DIPHENHYDRAMINE HCL 12.5MG/5ML
25 ELIXIR ORAL EVERY 6 HOURS
Refills: 0 | Status: DISCONTINUED | OUTPATIENT
Start: 2025-04-02 | End: 2025-04-04

## 2025-04-02 RX ORDER — OXYTOCIN-SODIUM CHLORIDE 0.9% IV SOLN 30 UNIT/500ML 30-0.9/5 UT/ML-%
10 SOLUTION INTRAVENOUS ONCE
Refills: 0 | Status: COMPLETED | OUTPATIENT
Start: 2025-04-02 | End: 2025-04-02

## 2025-04-02 RX ORDER — OXYCODONE HYDROCHLORIDE 30 MG/1
5 TABLET ORAL ONCE
Refills: 0 | Status: DISCONTINUED | OUTPATIENT
Start: 2025-04-02 | End: 2025-04-04

## 2025-04-02 RX ORDER — IBUPROFEN 200 MG
600 TABLET ORAL EVERY 6 HOURS
Refills: 0 | Status: DISCONTINUED | OUTPATIENT
Start: 2025-04-02 | End: 2025-04-04

## 2025-04-02 RX ORDER — KETOROLAC TROMETHAMINE 30 MG/ML
30 INJECTION, SOLUTION INTRAMUSCULAR; INTRAVENOUS ONCE
Refills: 0 | Status: DISCONTINUED | OUTPATIENT
Start: 2025-04-02 | End: 2025-04-02

## 2025-04-02 RX ADMIN — SODIUM CHLORIDE 50 MILLILITER(S): 9 INJECTION, SOLUTION INTRAVENOUS at 17:23

## 2025-04-02 RX ADMIN — KETOROLAC TROMETHAMINE 30 MILLIGRAM(S): 30 INJECTION, SOLUTION INTRAMUSCULAR; INTRAVENOUS at 11:00

## 2025-04-02 RX ADMIN — DIBUCAINE 1 APPLICATION(S): 10 OINTMENT TOPICAL at 17:23

## 2025-04-02 RX ADMIN — Medication 975 MILLIGRAM(S): at 22:34

## 2025-04-02 RX ADMIN — Medication 3 MILLILITER(S): at 22:00

## 2025-04-02 RX ADMIN — Medication 30 MILLIGRAM(S): at 17:52

## 2025-04-02 RX ADMIN — Medication 1000 MILLIGRAM(S): at 08:13

## 2025-04-02 RX ADMIN — Medication 1 TABLET(S): at 17:34

## 2025-04-02 RX ADMIN — Medication 975 MILLIGRAM(S): at 22:04

## 2025-04-02 RX ADMIN — Medication 50 GM/HR: at 19:12

## 2025-04-02 RX ADMIN — BENZOCAINE 1 SPRAY(S): 220 SPRAY, METERED PERIODONTAL at 17:22

## 2025-04-02 RX ADMIN — Medication 10 MILLIGRAM(S): at 17:52

## 2025-04-02 RX ADMIN — KETOROLAC TROMETHAMINE 30 MILLIGRAM(S): 30 INJECTION, SOLUTION INTRAMUSCULAR; INTRAVENOUS at 10:45

## 2025-04-02 RX ADMIN — Medication 50 GM/HR: at 14:54

## 2025-04-02 RX ADMIN — OXYTOCIN-SODIUM CHLORIDE 0.9% IV SOLN 30 UNIT/500ML 2 MILLIUNIT(S)/MIN: 30-0.9/5 SOLUTION at 07:58

## 2025-04-02 RX ADMIN — Medication 1 APPLICATION(S): at 17:22

## 2025-04-02 RX ADMIN — OXYTOCIN-SODIUM CHLORIDE 0.9% IV SOLN 30 UNIT/500ML 167 MILLIUNIT(S)/MIN: 30-0.9/5 SOLUTION at 09:42

## 2025-04-02 RX ADMIN — Medication 600 MILLIGRAM(S): at 19:10

## 2025-04-02 RX ADMIN — Medication 50 GM/HR: at 07:11

## 2025-04-02 RX ADMIN — Medication 1 APPLICATION(S): at 01:17

## 2025-04-02 RX ADMIN — Medication 600 MILLIGRAM(S): at 17:57

## 2025-04-02 RX ADMIN — Medication 975 MILLIGRAM(S): at 15:22

## 2025-04-02 RX ADMIN — Medication 1 APPLICATION(S): at 17:23

## 2025-04-02 RX ADMIN — Medication 400 MILLIGRAM(S): at 07:58

## 2025-04-02 RX ADMIN — OXYTOCIN-SODIUM CHLORIDE 0.9% IV SOLN 30 UNIT/500ML 10 UNIT(S): 30-0.9/5 SOLUTION at 09:28

## 2025-04-02 RX ADMIN — Medication 3 MILLILITER(S): at 15:20

## 2025-04-02 RX ADMIN — Medication 975 MILLIGRAM(S): at 16:10

## 2025-04-02 NOTE — OB RN DELIVERY SUMMARY - NS_SEPSISRSKCALC_OBGYN_ALL_OB_FT
EOS calculated successfully. EOS Risk Factor: 0.5/1000 live births ([facility] national incidence); GA=37w3d; Temp=98.24; ROM=2.133; GBS='Negative'; Antibiotics='No antibiotics or any antibiotics < 2 hrs prior to birth'

## 2025-04-02 NOTE — CHART NOTE - NSCHARTNOTEFT_GEN_A_CORE
Attending Note     Pt comfortable with epidural     Vital Signs Last 24 Hrs  T(C): 36.4 (02 Apr 2025 06:00), Max: 36.8 (02 Apr 2025 00:00)  T(F): 97.52 (02 Apr 2025 06:00), Max: 98.24 (02 Apr 2025 00:00)  HR: 98 (02 Apr 2025 07:38) (62 - 121)  BP: 129/85 (02 Apr 2025 07:41) (77/40 - 166/91)  BP(mean): --  RR: 16 (02 Apr 2025 06:00) (14 - 18)  SpO2: 98% (02 Apr 2025 07:38) (77% - 100%)    Parameters below as of 01 Apr 2025 18:21  Patient On (Oxygen Delivery Method): room air    Gen in NAD   Abd soft, gravid  Ext: no c/c/e     SVE 7/50/3, floppy   AROMed for clera fliud  FHTS 130 mod jeni no decels no accels   TOCO irregular     A/P: P1 at 37 weeks IOL for pre-x   on mg   start pitocin   peanut ball   pt had EFW last week 6#12 with AC > 90%, discused with pt in office risks of shoulder dystocia including but not limited to erbs palsy  pt wants to continue with trial of labor     ANNIA Vasquez MD

## 2025-04-02 NOTE — OB PROVIDER LABOR PROGRESS NOTE - ASSESSMENT
35yo  @37+3 IOL for sPEC by HA criteria s/p Pro 10IR but not on standing BP medication. Patient making appropriate cervical change    - SVE: /-2, intact  - s/p BC and CRB. Contractions adequate, will expectantly manage  - FHT Cat 1  - For pitocin if contractions space out    d/w Dr. Alexa Solorio, PGY4
33yo  @37+3 IOL for sPEC    - SVE: 3/50/-3, intact  - CRB placed at 12:30a  - [facility] Cat 1  - c/w BC    d/w Dr. Alexa Solorio, PGY4

## 2025-04-02 NOTE — OB RN DELIVERY SUMMARY - NS_CORDBLDTYPEA_OBGYN_ALL_OB
[FreeTextEntry1] : The patient had frequency and was placed on oxybutynin but it did not agree with him  He felt like was having a sensation of voiding and then not be able to.  he voids about every hour or so.  No hematuria or dysuria.  He feels empty after voiding.  He has decreased energy level.  he is awake multiple times per night.  
Yes

## 2025-04-02 NOTE — OB RN DELIVERY SUMMARY - NSSELHIDDEN_OBGYN_ALL_OB_FT
[NS_DeliveryAttending1_OBGYN_ALL_OB_FT:[id number]=],[NS_DeliveryRN_OBGYN_ALL_OB_FT:[id number]]

## 2025-04-02 NOTE — DISCHARGE NOTE OB - NS OB DC MMR REASON NOT RECEIVED
Continued delirium note overnight, but slept well with pain managed and quiet environment promoted; CRRT in progress with net UF close to 10ml/hr throughout shift; progress made in weaning levophed this AM; wife at bedside    Problem: Adult Inpatient Plan of Care  Goal: Plan of Care Review  Outcome: Progressing  Goal: Patient-Specific Goal (Individualized)  Outcome: Progressing  Goal: Absence of Hospital-Acquired Illness or Injury  Outcome: Progressing  Goal: Optimal Comfort and Wellbeing  Outcome: Progressing  Goal: Readiness for Transition of Care  Outcome: Progressing     Problem: Infection  Goal: Absence of Infection Signs and Symptoms  Outcome: Progressing     Problem: Wound  Goal: Optimal Coping  Outcome: Progressing  Goal: Optimal Functional Ability  Outcome: Progressing  Goal: Absence of Infection Signs and Symptoms  Outcome: Progressing  Goal: Improved Oral Intake  Outcome: Progressing  Goal: Optimal Pain Control and Function  Outcome: Progressing  Goal: Skin Health and Integrity  Outcome: Progressing  Goal: Optimal Wound Healing  Outcome: Progressing     Problem: Skin Injury Risk Increased  Goal: Skin Health and Integrity  Outcome: Progressing     Problem: Hemodialysis  Goal: Safe, Effective Therapy Delivery  Outcome: Progressing  Goal: Effective Tissue Perfusion  Outcome: Progressing  Goal: Absence of Infection Signs and Symptoms  Outcome: Progressing     Problem: Fall Injury Risk  Goal: Absence of Fall and Fall-Related Injury  Outcome: Progressing     Problem: CRRT (Continuous Renal Replacement Therapy)  Goal: Safe, Effective Therapy Delivery  Outcome: Progressing  Goal: Hemodynamic Stability  Outcome: Progressing  Goal: Body Temperature Maintained in Desired Range  Outcome: Progressing  Goal: Absence of Infection Signs and Symptoms  Outcome: Progressing     Problem: Coping Ineffective  Goal: Effective Coping  Outcome: Progressing     Problem: Delirium  Goal: Optimal Coping  Outcome: Progressing  Goal:  Improved Behavioral Control  Outcome: Progressing  Goal: Improved Attention and Thought Clarity  Outcome: Progressing  Goal: Improved Sleep  Outcome: Progressing      Contraindicated

## 2025-04-02 NOTE — DISCHARGE NOTE OB - PATIENT PORTAL LINK FT
You can access the FollowMyHealth Patient Portal offered by [facility] by registering at the following website: [email or web address]. By joining [facility]’s FollowMyHealth portal, you will also be able to view your health information using other applications (apps) compatible with our system.

## 2025-04-02 NOTE — OB PROVIDER LABOR PROGRESS NOTE - NS_SUBJECTIVE/OBJECTIVE_OBGYN_ALL_OB_FT
Patient re-examined for progress. Reports pain well controlled with epidural    T(C): 36.3 (04-02-25 @ 04:00), Max: 36.8 (04-02-25 @ 00:00)  HR: 83 (04-02-25 @ 04:27) (62 - 121)  BP: 126/75 (04-02-25 @ 04:02) (77/40 - 166/91)  RR: 14 (04-02-25 @ 04:00) (14 - 18)  SpO2: 99% (04-02-25 @ 04:27) (80% - 100%)
Note delayed 2/2 clinical duties    Patient re-examined and 3/50/-3. Cervical ripening balloon placed with 80cc of fluid. Patient tolerated procedure well    T(C): 36.8 (04-02-25 @ 00:00), Max: 36.8 (04-02-25 @ 00:00)  HR: 93 (04-02-25 @ 01:31) (80 - 115)  BP: 130/85 (04-02-25 @ 01:28) (117/90 - 166/91)  RR: 18 (04-02-25 @ 00:00) (14 - 18)  SpO2: 98% (04-02-25 @ 01:31) (88% - 100%)

## 2025-04-02 NOTE — DISCHARGE NOTE OB - PLAN OF CARE
please take blood pressure three times per day   please call if BP > 140/90, headache, vision changes, right upper quadrant and epigastric pain  please go to hospital if /100 nothing in the vagina x 6 weeks  your blood pressure monitor from [facility] on 1st floor of [facility]. Follow-up in your OB office within 1 week for a blood pressure check.   Please take your blood pressure 3x/day. Call your doctor if your blood pressure is 140 (top number) OR 90 (bottom number) or higher. Return to hospital if your blood pressure is 160 (top number)  (bottom number) or higher. Call your doctor if you experience symptoms such as headache, blurry vision, epigastric pain, or nausea/vomiting. After discharge, please stay on pelvic rest for 6 weeks, meaning no sexual intercourse, no tampons and no douching.  No driving for 2 weeks.  No lifting objects heavier than baby for 2 weeks.  Expect to have vaginal bleeding/spotting for up to six weeks.  The bleeding should get lighter and more white/light brown with time.  For bleeding soaking more than a pad an hour or passing clots greater than the size of your fist, come in to the   emergency department.  Follow up in the office in 6 weeks

## 2025-04-02 NOTE — DISCHARGE NOTE OB - CARE PROVIDER_API CALL
Neida Vasquez  Obstetrics and Gynecology  [address], [address]  [place] [address]  Phone: [phone]  Fax: [phone]  Follow Up Time:

## 2025-04-02 NOTE — DISCHARGE NOTE OB - FINANCIAL ASSISTANCE
[facility] provides services at a reduced cost to those who are determined to be eligible through [facility]’s financial assistance program. Information regarding [facility]’s financial assistance program can be found by going to [email or web address] or by calling [phone].

## 2025-04-02 NOTE — DISCHARGE NOTE OB - MEDICATION SUMMARY - MEDICATIONS TO TAKE
I will START or STAY ON the medications listed below when I get home from the hospital:    electronic blood pressure monitor  -- please check blood pressure 3x a day, keep a blood pressure log and notify OBGYN for elevated blood pressures, signs and symptoms  -- Indication: For to monitor blood pressure    ibuprofen 600 mg oral tablet  -- 1 tab(s) by mouth every 6 hours  -- Indication: For as needed for pain    acetaminophen 325 mg oral tablet  -- 3 tab(s) by mouth every 8 hours  -- Indication: For as needed for pain    NIFEdipine 30 mg oral tablet, extended release  -- 1 tab(s) by mouth once a day  -- Indication: For to control high blood pressure    I will START or STAY ON the medications listed below when I get home from the hospital:    electronic blood pressure monitor  -- please check blood pressure 3x a day, keep a blood pressure log and notify OBGYN for elevated blood pressures, signs and symptoms  -- Indication: For Severe Pre-eclampsia    ibuprofen 600 mg oral tablet  -- 1 tab(s) by mouth every 6 hours as needed for  moderate pain  -- Indication: For as needed for pain    acetaminophen 325 mg oral tablet  -- 3 tab(s) by mouth every 8 hours as needed for  mild pain  -- Indication: For as needed for pain    NIFEdipine 30 mg oral tablet, extended release  -- 1 tab(s) by mouth once a day Do not take if B/P is less than 110/60  -- Indication: For Severe Pre-eclampsia

## 2025-04-02 NOTE — DISCHARGE NOTE OB - NSDCCPGOAL_GEN_ALL_CORE_FT
return to normal health [facility] Pregnancy And Lactation Text: This medication has not been assigned a Pregnancy Risk Category. It is unknown if the medication is excreted in breast milk.

## 2025-04-02 NOTE — OB PROVIDER DELIVERY SUMMARY - NSPROVIDERDELIVERYNOTE_OBGYN_ALL_OB_FT
Attending Note     Pt progressed to 10/100/+2   Delivered vertex over intact perineum   Tight nuchal cord x 3   shoulders and body delivered with no issues   Delayed cord clamping   Placenta delivered intact and uterine cavity empty   Uterine atony noted and resolved with massage/10 pitocin IM   Vagina, rectum and cervix inspected  2nd degree laceration noted and repaired in usual fashion   Rectal exam intact  Girl       ANNIA Vasquez MD

## 2025-04-02 NOTE — DISCHARGE NOTE OB - CARE PLAN
1 Principal Discharge DX:	Normal vaginal delivery  Assessment and plan of treatment:	nothing in the vagina x 6 weeks  Secondary Diagnosis:	Pre-eclampsia, third trimester  Assessment and plan of treatment:	please take blood pressure three times per day   please call if BP > 140/90, headache, vision changes, right upper quadrant and epigastric pain  please go to hospital if /100   Principal Discharge DX:	Normal vaginal delivery  Assessment and plan of treatment:	After discharge, please stay on pelvic rest for 6 weeks, meaning no sexual intercourse, no tampons and no douching.  No driving for 2 weeks.  No lifting objects heavier than baby for 2 weeks.  Expect to have vaginal bleeding/spotting for up to six weeks.  The bleeding should get lighter and more white/light brown with time.  For bleeding soaking more than a pad an hour or passing clots greater than the size of your fist, come in to the   emergency department.  Follow up in the office in 6 weeks  Secondary Diagnosis:	Pre-eclampsia, third trimester  Assessment and plan of treatment:	 your blood pressure monitor from [facility] on 1st floor of [facility]. Follow-up in your OB office within 1 week for a blood pressure check.   Please take your blood pressure 3x/day. Call your doctor if your blood pressure is 140 (top number) OR 90 (bottom number) or higher. Return to hospital if your blood pressure is 160 (top number)  (bottom number) or higher. Call your doctor if you experience symptoms such as headache, blurry vision, epigastric pain, or nausea/vomiting.

## 2025-04-02 NOTE — OB NEONATOLOGY/PEDIATRICIAN DELIVERY SUMMARY - NSPEDSNEONOTESA_OBGYN_ALL_OB_FT
Baby is a 37.3 wk AGA female born to a 35 y/o  mother via . Peds called to delivery for heavy meconium. Maternal history of anxiety, no meds. Pregnancy complicated by sPEC, s/p magnesium. Maternal blood type O+. PNL HIV negative, HepB unknown, HepC unknown, RPR non-reactive, and Rubella immune. GBS negative on 3/25. AROM at 7:14 on , heavy mec. Delivery uncomplicated. Baby born vigorous and crying spontaneously. Warmed, dried, suctioned and stimulated. Apgars 7/8. Stool x1. Highest maternal temp 36.8 C. EOS 0.09. Mom plans to breastfeed and bottle feed. Consents HepB vaccine.     BW: 3300g  : 25  TOB: 9:22am    Physical Exam:  Gen: NAD, +grimace  HEENT: anterior fontanel open soft and flat, no cleft lip/palate, ears normal set, no ear pits or tags. no lesions in mouth/throat, nares clinically patent  Resp: no increased work of breathing, good air entry b/l, clear to auscultation bilaterally  Cardio: normal S1/S2, regular rate and rhythm, no murmur appreciated  Abd: soft, non tender, non distended, + bowel sounds, umbilical cord with 3 vessels  Back: spine midline, +sacral dimple, no tuft of hair  Neuro: +grasp/suck/sunday/palmar/plantar, diminished upper extremity tone  Extremities: negative arthur and ortolani, moving all extremities, full range of motion x 4, no crepitus  Skin: pink, warm, acrocyanosis  Genitals: Normal female anatomy, Chucky 1, anus patent

## 2025-04-03 LAB
ALBUMIN SERPL ELPH-MCNC: 2.9 G/DL — LOW (ref 3.3–5)
ALP SERPL-CCNC: 128 U/L — HIGH (ref 40–120)
ALT FLD-CCNC: 12 U/L — SIGNIFICANT CHANGE UP (ref 4–33)
ANION GAP SERPL CALC-SCNC: 13 MMOL/L — SIGNIFICANT CHANGE UP (ref 7–14)
AST SERPL-CCNC: 21 U/L — SIGNIFICANT CHANGE UP (ref 4–32)
BASOPHILS # BLD AUTO: 0.08 K/UL — SIGNIFICANT CHANGE UP (ref 0–0.2)
BASOPHILS NFR BLD AUTO: 0.5 % — SIGNIFICANT CHANGE UP (ref 0–2)
BILIRUB SERPL-MCNC: <0.2 MG/DL — SIGNIFICANT CHANGE UP (ref 0.2–1.2)
BUN SERPL-MCNC: 3 MG/DL — LOW (ref 7–23)
CALCIUM SERPL-MCNC: 6.7 MG/DL — LOW (ref 8.4–10.5)
CHLORIDE SERPL-SCNC: 103 MMOL/L — SIGNIFICANT CHANGE UP (ref 98–107)
CO2 SERPL-SCNC: 18 MMOL/L — LOW (ref 22–31)
CREAT SERPL-MCNC: 0.47 MG/DL — LOW (ref 0.5–1.3)
EGFR: 127 ML/MIN/1.73M2 — SIGNIFICANT CHANGE UP
EGFR: 127 ML/MIN/1.73M2 — SIGNIFICANT CHANGE UP
EOSINOPHIL # BLD AUTO: 0.23 K/UL — SIGNIFICANT CHANGE UP (ref 0–0.5)
EOSINOPHIL NFR BLD AUTO: 1.5 % — SIGNIFICANT CHANGE UP (ref 0–6)
GLUCOSE SERPL-MCNC: 87 MG/DL — SIGNIFICANT CHANGE UP (ref 70–99)
HBV SURFACE AG SERPL QL IA: SIGNIFICANT CHANGE UP
HCT VFR BLD CALC: 29.8 % — LOW (ref 34.5–45)
HGB BLD-MCNC: 10.2 G/DL — LOW (ref 11.5–15.5)
IANC: 10.69 K/UL — HIGH (ref 1.8–7.4)
IMM GRANULOCYTES NFR BLD AUTO: 1 % — HIGH (ref 0–0.9)
LDH SERPL L TO P-CCNC: 236 U/L — HIGH (ref 135–225)
LYMPHOCYTES # BLD AUTO: 19.1 % — SIGNIFICANT CHANGE UP (ref 13–44)
LYMPHOCYTES # BLD AUTO: 2.9 K/UL — SIGNIFICANT CHANGE UP (ref 1–3.3)
MAGNESIUM SERPL-MCNC: 4.7 MG/DL — HIGH (ref 1.6–2.6)
MAGNESIUM SERPL-MCNC: 4.8 MG/DL — HIGH (ref 1.6–2.6)
MCHC RBC-ENTMCNC: 29.3 PG — SIGNIFICANT CHANGE UP (ref 27–34)
MCHC RBC-ENTMCNC: 34.2 G/DL — SIGNIFICANT CHANGE UP (ref 32–36)
MCV RBC AUTO: 85.6 FL — SIGNIFICANT CHANGE UP (ref 80–100)
MONOCYTES # BLD AUTO: 1.14 K/UL — HIGH (ref 0–0.9)
MONOCYTES NFR BLD AUTO: 7.5 % — SIGNIFICANT CHANGE UP (ref 2–14)
NEUTROPHILS # BLD AUTO: 10.69 K/UL — HIGH (ref 1.8–7.4)
NEUTROPHILS NFR BLD AUTO: 70.4 % — SIGNIFICANT CHANGE UP (ref 43–77)
NRBC # BLD AUTO: 0 K/UL — SIGNIFICANT CHANGE UP (ref 0–0)
NRBC # FLD: 0 K/UL — SIGNIFICANT CHANGE UP (ref 0–0)
NRBC BLD AUTO-RTO: 0 /100 WBCS — SIGNIFICANT CHANGE UP (ref 0–0)
PLATELET # BLD AUTO: 279 K/UL — SIGNIFICANT CHANGE UP (ref 150–400)
POTASSIUM SERPL-MCNC: 4.1 MMOL/L — SIGNIFICANT CHANGE UP (ref 3.5–5.3)
POTASSIUM SERPL-SCNC: 4.1 MMOL/L — SIGNIFICANT CHANGE UP (ref 3.5–5.3)
PROT SERPL-MCNC: 5.5 G/DL — LOW (ref 6–8.3)
RBC # BLD: 3.48 M/UL — LOW (ref 3.8–5.2)
RBC # FLD: 13.9 % — SIGNIFICANT CHANGE UP (ref 10.3–14.5)
SODIUM SERPL-SCNC: 134 MMOL/L — LOW (ref 135–145)
URATE SERPL-MCNC: 3 MG/DL — SIGNIFICANT CHANGE UP (ref 2.5–7)
WBC # BLD: 15.19 K/UL — HIGH (ref 3.8–10.5)
WBC # FLD AUTO: 15.19 K/UL — HIGH (ref 3.8–10.5)

## 2025-04-03 RX ORDER — MAGNESIUM SULFATE 500 MG/ML
2 SYRINGE (ML) INJECTION
Qty: 40 | Refills: 0 | Status: DISCONTINUED | OUTPATIENT
Start: 2025-04-03 | End: 2025-04-03

## 2025-04-03 RX ADMIN — Medication 600 MILLIGRAM(S): at 06:51

## 2025-04-03 RX ADMIN — Medication 975 MILLIGRAM(S): at 22:26

## 2025-04-03 RX ADMIN — Medication 1 TABLET(S): at 12:05

## 2025-04-03 RX ADMIN — Medication 975 MILLIGRAM(S): at 21:23

## 2025-04-03 RX ADMIN — Medication 975 MILLIGRAM(S): at 04:58

## 2025-04-03 RX ADMIN — Medication 975 MILLIGRAM(S): at 11:00

## 2025-04-03 RX ADMIN — Medication 975 MILLIGRAM(S): at 04:28

## 2025-04-03 RX ADMIN — Medication 600 MILLIGRAM(S): at 12:05

## 2025-04-03 RX ADMIN — Medication 975 MILLIGRAM(S): at 17:00

## 2025-04-03 RX ADMIN — Medication 600 MILLIGRAM(S): at 00:01

## 2025-04-03 RX ADMIN — Medication 600 MILLIGRAM(S): at 18:14

## 2025-04-03 RX ADMIN — Medication 975 MILLIGRAM(S): at 10:06

## 2025-04-03 RX ADMIN — Medication 975 MILLIGRAM(S): at 16:08

## 2025-04-03 RX ADMIN — Medication 3 MILLILITER(S): at 06:22

## 2025-04-03 RX ADMIN — Medication 50 GM/HR: at 07:22

## 2025-04-03 RX ADMIN — Medication 600 MILLIGRAM(S): at 19:00

## 2025-04-03 RX ADMIN — Medication 80 MILLIGRAM(S): at 10:06

## 2025-04-03 RX ADMIN — Medication 600 MILLIGRAM(S): at 06:21

## 2025-04-03 RX ADMIN — Medication 30 MILLIGRAM(S): at 18:19

## 2025-04-03 RX ADMIN — Medication 600 MILLIGRAM(S): at 13:00

## 2025-04-03 NOTE — ANESTHESIA FOLLOW-UP NOTE - NSEVALATIONFT_GEN_ALL_CORE
ANESTHESIA POST-EPIDURAL CHECK    34y Female s/p  DAY 1     No COMPLAINTS    NO APPARENT ANESTHESIA COMPLICATIONS

## 2025-04-04 VITALS
TEMPERATURE: 98 F | DIASTOLIC BLOOD PRESSURE: 90 MMHG | HEART RATE: 80 BPM | RESPIRATION RATE: 18 BRPM | OXYGEN SATURATION: 98 % | SYSTOLIC BLOOD PRESSURE: 143 MMHG

## 2025-04-04 LAB — MAGNESIUM SERPL-MCNC: 1.6 MG/DL — SIGNIFICANT CHANGE UP (ref 1.6–2.6)

## 2025-04-04 RX ORDER — ACETAMINOPHEN 500 MG/5ML
3 LIQUID (ML) ORAL
Qty: 0 | Refills: 0 | DISCHARGE
Start: 2025-04-04

## 2025-04-04 RX ORDER — IBUPROFEN 200 MG
1 TABLET ORAL
Qty: 0 | Refills: 0 | DISCHARGE
Start: 2025-04-04

## 2025-04-04 RX ORDER — NIFEDIPINE 30 MG
1 TABLET, EXTENDED RELEASE 24 HR ORAL
Qty: 0 | Refills: 0 | DISCHARGE
Start: 2025-04-04

## 2025-04-04 RX ORDER — NIFEDIPINE 30 MG
1 TABLET, EXTENDED RELEASE 24 HR ORAL
Qty: 30 | Refills: 0
Start: 2025-04-04 | End: 2025-05-03

## 2025-04-04 RX ADMIN — Medication 600 MILLIGRAM(S): at 00:40

## 2025-04-04 RX ADMIN — Medication 600 MILLIGRAM(S): at 02:35

## 2025-04-04 RX ADMIN — Medication 600 MILLIGRAM(S): at 12:05

## 2025-04-04 RX ADMIN — Medication 975 MILLIGRAM(S): at 04:37

## 2025-04-04 RX ADMIN — Medication 600 MILLIGRAM(S): at 06:18

## 2025-04-04 RX ADMIN — Medication 975 MILLIGRAM(S): at 09:03

## 2025-04-04 RX ADMIN — Medication 600 MILLIGRAM(S): at 11:37

## 2025-04-04 RX ADMIN — Medication 975 MILLIGRAM(S): at 10:00

## 2025-04-04 RX ADMIN — Medication 600 MILLIGRAM(S): at 07:30

## 2025-04-04 RX ADMIN — Medication 1 TABLET(S): at 11:36

## 2025-04-04 RX ADMIN — Medication 975 MILLIGRAM(S): at 03:36

## 2025-04-04 NOTE — PROGRESS NOTE ADULT - ATTENDING COMMENTS
Patient seen and agree with above  BP stable and asymptomatic on procardia 30xl  stable for discharge  JIMENA Corley
OBGYN Attending    Pt seen at bedside.  Agree with above.  Doing well PPD#1.  Pain controlled.  Tolerating PO.  Voiding.  Ambulating.  Abd soft, NT.  FF, NT.  s/p Mg.  BP controlled on Procardia.  No signs/symptoms of worsening pre-eclampsia.   Routine PP care.  Discharge planning.    SUDHEER Spann MD

## 2025-04-04 NOTE — PROGRESS NOTE ADULT - ASSESSMENT
33y/o PPD#1 from  in stable condition. PMH significant for sPEC.     #sPEC  - cw mg  - cont Pro30  - cont to monitor symptoms/ vitals     #Postpartum  - Continue with PO analgesia  - Increase ambulation  - Continue regular diet      Terese Elder PGY1
35y/o PPD#2 from  in stable condition. PMH significant for sPEC.     #sPEC  - cw mg  - cont Pro30  - cont to monitor symptoms/ vitals     #Postpartum  - Continue with PO analgesia  - Increase ambulation  - Continue regular diet      Terese Elder PGY1

## 2025-04-04 NOTE — PROGRESS NOTE ADULT - SUBJECTIVE AND OBJECTIVE BOX
R1 Progress Note    Patient seen and examined at bedside, no acute overnight events. No acute complaints, pain well controlled. Patient is ambulating, voiding spontaneously, passing gas, and tolerating regular diet. Denies CP, SOB, N/V, HA, or blurred vision.     Vital Signs Last 24 Hours  T(C): 36.7 (04-03-25 @ 01:59), Max: 36.7 (04-02-25 @ 07:59)  HR: 84 (04-03-25 @ 04:00) (75 - 131)  BP: 130/78 (04-03-25 @ 04:00) (119/78 - 155/93)  RR: 20 (04-03-25 @ 04:00) (16 - 20)  SpO2: 100% (04-03-25 @ 04:00) (77% - 100%)    Physical Exam:  General: NAD  Abdomen: Soft, non-tender, non-distended, fundus firm  Pelvic: Lochia wnl    Labs:    Blood Type: O Positive  Antibody Screen: Negative  RPR: Negative               11.8   13.75 )-----------( 311      ( 04-01 @ 15:56 )             33.7         MEDICATIONS  (STANDING):  acetaminophen     Tablet .. 975 milliGRAM(s) Oral <User Schedule>  diphtheria/tetanus/pertussis (acellular) Vaccine (Adacel) 0.5 milliLiter(s) IntraMuscular once  ibuprofen  Tablet. 600 milliGRAM(s) Oral every 6 hours  lactated ringers. 1000 milliLiter(s) (50 mL/Hr) IV Continuous <Continuous>  NIFEdipine XL 30 milliGRAM(s) Oral daily  oxytocin Infusion 167 milliUNIT(s)/Min (167 mL/Hr) IV Continuous <Continuous>  prenatal multivitamin 1 Tablet(s) Oral daily  sodium chloride 0.9% lock flush 3 milliLiter(s) IV Push every 8 hours  sodium chloride 0.9% lock flush 3 milliLiter(s) IV Push every 8 hours    MEDICATIONS  (PRN):  benzocaine 20%/menthol 0.5% Spray 1 Spray(s) Topical every 6 hours PRN for Perineal discomfort  dibucaine 1% Ointment 1 Application(s) Topical every 6 hours PRN Perineal discomfort  diphenhydrAMINE 25 milliGRAM(s) Oral every 6 hours PRN Pruritus  hydrocortisone 1% Cream 1 Application(s) Topical every 6 hours PRN Moderate Pain (4-6)  lanolin Ointment 1 Application(s) Topical every 6 hours PRN nipple soreness  magnesium hydroxide Suspension 30 milliLiter(s) Oral two times a day PRN Constipation  oxyCODONE    IR 5 milliGRAM(s) Oral every 3 hours PRN Moderate to Severe Pain (4-10)  oxyCODONE    IR 5 milliGRAM(s) Oral once PRN Moderate to Severe Pain (4-10)  pramoxine 1%/zinc 5% Cream 1 Application(s) Topical every 4 hours PRN Moderate Pain (4-6)  simethicone 80 milliGRAM(s) Chew every 4 hours PRN Gas  witch hazel Pads 1 Application(s) Topical every 4 hours PRN Perineal discomfort  
R1 Progress Note    Patient seen and examined at bedside, no acute overnight events. No acute complaints, pain well controlled. Patient is ambulating, voiding spontaneously, passing gas, and tolerating regular diet. Denies CP, SOB, N/V, HA, or blurred vision.     Vital Signs Last 24 Hours  T(C): 36.8 (04-04-25 @ 01:35), Max: 36.9 (04-03-25 @ 18:15)  HR: 98 (04-04-25 @ 01:35) (67 - 98)  BP: 135/87 (04-04-25 @ 01:35) (123/75 - 146/75)  RR: 18 (04-04-25 @ 01:35) (16 - 18)  SpO2: 99% (04-04-25 @ 01:35) (96% - 100%)    Physical Exam:  General: NAD  Abdomen: Soft, non-tender, non-distended, fundus firm  Pelvic: Lochia wnl    Labs:    Blood Type: O Positive  Antibody Screen: Negative  RPR: Negative               10.2   15.19 )-----------( 279      ( 04-03 @ 05:58 )             29.8                11.8   13.75 )-----------( 311      ( 04-01 @ 15:56 )             33.7         MEDICATIONS  (STANDING):  acetaminophen     Tablet .. 975 milliGRAM(s) Oral <User Schedule>  diphtheria/tetanus/pertussis (acellular) Vaccine (Adacel) 0.5 milliLiter(s) IntraMuscular once  ibuprofen  Tablet. 600 milliGRAM(s) Oral every 6 hours  lactated ringers. 1000 milliLiter(s) (50 mL/Hr) IV Continuous <Continuous>  NIFEdipine XL 30 milliGRAM(s) Oral daily  oxytocin Infusion 167 milliUNIT(s)/Min (167 mL/Hr) IV Continuous <Continuous>  prenatal multivitamin 1 Tablet(s) Oral daily  sodium chloride 0.9% lock flush 3 milliLiter(s) IV Push every 8 hours  sodium chloride 0.9% lock flush 3 milliLiter(s) IV Push every 8 hours    MEDICATIONS  (PRN):  benzocaine 20%/menthol 0.5% Spray 1 Spray(s) Topical every 6 hours PRN for Perineal discomfort  dibucaine 1% Ointment 1 Application(s) Topical every 6 hours PRN Perineal discomfort  diphenhydrAMINE 25 milliGRAM(s) Oral every 6 hours PRN Pruritus  hydrocortisone 1% Cream 1 Application(s) Topical every 6 hours PRN Moderate Pain (4-6)  lanolin Ointment 1 Application(s) Topical every 6 hours PRN nipple soreness  magnesium hydroxide Suspension 30 milliLiter(s) Oral two times a day PRN Constipation  oxyCODONE    IR 5 milliGRAM(s) Oral every 3 hours PRN Moderate to Severe Pain (4-10)  oxyCODONE    IR 5 milliGRAM(s) Oral once PRN Moderate to Severe Pain (4-10)  pramoxine 1%/zinc 5% Cream 1 Application(s) Topical every 4 hours PRN Moderate Pain (4-6)  simethicone 80 milliGRAM(s) Chew every 4 hours PRN Gas  witch hazel Pads 1 Application(s) Topical every 4 hours PRN Perineal discomfort

## 2025-04-10 ENCOUNTER — APPOINTMENT (OUTPATIENT)
Dept: ANTEPARTUM | Facility: CLINIC | Age: 35
End: 2025-04-10

## 2025-04-10 ENCOUNTER — OUTPATIENT (OUTPATIENT)
Dept: INPATIENT UNIT | Facility: HOSPITAL | Age: 35
LOS: 1 days | End: 2025-04-10

## 2025-04-10 DIAGNOSIS — O26.899 OTHER SPECIFIED PREGNANCY RELATED CONDITIONS, UNSPECIFIED TRIMESTER: ICD-10-CM

## 2025-04-10 NOTE — CHART NOTE - NSCHARTNOTEFT_GEN_A_CORE
Patient arrived at [facility] after reporting to MD office that she had elevated BP reading 150s/100s with headache. Patient is PPD8 s/p  c/b sPEC/Mg. Patient was discharged on Procardia 30mg with good blood pressure control. Patient called MD office today with a headache and elevated BPs not in severe range. On arrival to triage, patient reports resolution of headache after taking Tylenol at home. Patient showed BP log, all 130s-140s/90s. Discussed with patient that given BPs not in severe range, no need for readmission for magnesium prophylaxis at this time. Advised patient to increase daily Procardia dose to 60mg. Patient aware to continue measuring BPs and call MD office if >160/110 and monitor for s/sx PEC including HA not resolved by medications, vision changes, chest pain, SOB, RUQ pain.     Seen and evaluated with Dr. Joseph Lopez PGY1

## 2025-04-11 ENCOUNTER — APPOINTMENT (OUTPATIENT)
Age: 35
End: 2025-04-11
Payer: MEDICAID

## 2025-04-11 PROCEDURE — S9445: CPT
